# Patient Record
Sex: MALE | Race: WHITE | NOT HISPANIC OR LATINO | URBAN - METROPOLITAN AREA
[De-identification: names, ages, dates, MRNs, and addresses within clinical notes are randomized per-mention and may not be internally consistent; named-entity substitution may affect disease eponyms.]

---

## 2024-09-11 ENCOUNTER — APPOINTMENT (RX ONLY)
Dept: URBAN - METROPOLITAN AREA CLINIC 61 | Facility: CLINIC | Age: 39
Setting detail: DERMATOLOGY
End: 2024-09-11

## 2024-09-11 DIAGNOSIS — L70.0 ACNE VULGARIS: ICD-10-CM

## 2024-09-11 DIAGNOSIS — L20.89 OTHER ATOPIC DERMATITIS: ICD-10-CM

## 2024-09-11 PROCEDURE — ? PATIENT SPECIFIC COUNSELING

## 2024-09-11 PROCEDURE — 99213 OFFICE O/P EST LOW 20 MIN: CPT

## 2024-09-11 PROCEDURE — ? PRESCRIPTION

## 2024-09-11 RX ORDER — CLOBETASOL PROPIONATE 0.05 G/100ML
SHAMPOO TOPICAL
Qty: 118 | Refills: 2 | Status: ERX | COMMUNITY
Start: 2024-09-11

## 2024-09-11 RX ADMIN — CLOBETASOL PROPIONATE: 0.05 SHAMPOO TOPICAL at 00:00

## 2024-09-11 ASSESSMENT — SEVERITY ASSESSMENT OVERALL AMONG ALL PATIENTS
IN YOUR EXPERIENCE, AMONG ALL PATIENTS YOU HAVE SEEN WITH THIS CONDITION, HOW SEVERE IS THIS PATIENT'S CONDITION?: NORMAL

## 2024-09-11 NOTE — PROCEDURE: PATIENT SPECIFIC COUNSELING
Patient was treated with a course of isotretinoin with complete clearing of his acne on the body.  On exam today he did not have any new lesions on his abdomen.  He is reporting that his scalp is flaring again, but on exam he had no acneiform lesions on the scalp, more eczematous.
Detail Level: Zone
Patient has ill-defined erythematous plaques with slight scale on the scalp consistent with an eczematous process.  He reports that he scalp is very itchy and tender at times.  He has previously tried topical steroid solutions without much improvement.  He has also been treated with medicated shampoo without improvement.  He underwent a course of isotretinoin which led to some improvement on his scalp, but since he has been off for the last couple months he has had recurrence of the symptoms.  This looks more eczematous in nature, we will try Clobex shampoo to see if that leads to any relief.  When he he returns to clinic in 2 months time if he is still having some flaring then may consider a KOH.  This does not look typical for tinea but would rule that out if it continues to flare.  Could also consider a punch biopsy if he is continue to have symptoms.

## 2024-10-07 ENCOUNTER — RX ONLY (OUTPATIENT)
Age: 39
Setting detail: RX ONLY
End: 2024-10-07

## 2024-10-07 RX ORDER — BETAMETHASONE DIPROPIONATE 0.5 MG/ML
LOTION TOPICAL
Qty: 60 | Refills: 0 | Status: ERX | COMMUNITY
Start: 2024-10-07

## 2025-04-09 ENCOUNTER — APPOINTMENT (OUTPATIENT)
Dept: URBAN - METROPOLITAN AREA CLINIC 61 | Facility: CLINIC | Age: 40
Setting detail: DERMATOLOGY
End: 2025-04-09

## 2025-04-09 DIAGNOSIS — L72.8 OTHER FOLLICULAR CYSTS OF THE SKIN AND SUBCUTANEOUS TISSUE: ICD-10-CM

## 2025-04-09 DIAGNOSIS — D22 MELANOCYTIC NEVI: ICD-10-CM

## 2025-04-09 DIAGNOSIS — L29.89 OTHER PRURITUS: ICD-10-CM

## 2025-04-09 PROBLEM — D22.5 MELANOCYTIC NEVI OF TRUNK: Status: ACTIVE | Noted: 2025-04-09

## 2025-04-09 PROCEDURE — ? COUNSELING

## 2025-04-09 PROCEDURE — ? PATIENT SPECIFIC COUNSELING

## 2025-04-09 PROCEDURE — 99213 OFFICE O/P EST LOW 20 MIN: CPT

## 2025-04-09 PROCEDURE — ? DEFER

## 2025-04-09 ASSESSMENT — LOCATION DETAILED DESCRIPTION DERM
LOCATION DETAILED: LEFT POSTERIOR SHOULDER
LOCATION DETAILED: LEFT INFERIOR UPPER BACK
LOCATION DETAILED: RIGHT INFERIOR MEDIAL MIDBACK

## 2025-04-09 ASSESSMENT — LOCATION SIMPLE DESCRIPTION DERM
LOCATION SIMPLE: LEFT UPPER BACK
LOCATION SIMPLE: RIGHT LOWER BACK
LOCATION SIMPLE: LEFT SHOULDER

## 2025-04-09 ASSESSMENT — LOCATION ZONE DERM
LOCATION ZONE: ARM
LOCATION ZONE: TRUNK

## 2025-04-09 NOTE — PROCEDURE: PATIENT SPECIFIC COUNSELING
Patient presents today with history of scalp itching that is very bothersome for him. He states he sometimes has flares of \"blister-like\" lesions on his scalp, but there are not any today. He has a few areas of excoriation on his scalp with hemorrhagic crusting. Patient has previously been treated for seborrheic dermatitis and eczema on the scalp with multiple different medicated shampoos and topical steroids without much relief. Patient also has a history of body and scalp acne, which was treated with isotretinoin. The acne on his body completely resolve, but the treatment did not clear his scalp itching. \\n\\nWe discussed that if he gets another flare of pustule-like bumps, he should contact clinic and discuss with an MA to get an urgent appointment when it is flared. As he has not responded to treatment, the next step would be a biopsy, may consider a DIF as well. \\n\\Cony he continues to have scalp itching even when he does not have any active skin condition on his scalp, we discussed that sometimes itching can be neurogenic in nature. He is going to see a Neurologist next month to discuss the possibility of neurogenic pruritus. We discussed some possible medications that can treat neurogenic pruritus such as gabapentin and amitriptyline, but patient reports that he has previously been on gabapentin for a different issue and he thought amitriptyline \"gave me a seizure\". Due to his complicated neurologic history, I would like any of these potential interventions managed by Neurology.\\n\\nIf he gets more inflammatory lesions, I will get a biopsy and manage any skin disease that may be impacting his scalp symptoms.
Detail Level: Zone

## 2025-04-09 NOTE — PROCEDURE: COUNSELING
Detail Level: Detailed
Patient Specific Counseling (Will Not Stick From Patient To Patient): Patient reports the cyst on his left posterior shoulder gets tender and he would like it removed. He will be scheduled for an excision.
Detail Level: Simple

## 2025-05-27 ENCOUNTER — APPOINTMENT (OUTPATIENT)
Dept: URBAN - METROPOLITAN AREA CLINIC 59 | Facility: CLINIC | Age: 40
Setting detail: DERMATOLOGY
End: 2025-05-27

## 2025-05-27 DIAGNOSIS — L72.8 OTHER FOLLICULAR CYSTS OF THE SKIN AND SUBCUTANEOUS TISSUE: ICD-10-CM

## 2025-05-27 PROCEDURE — 11402 EXC TR-EXT B9+MARG 1.1-2 CM: CPT

## 2025-05-27 PROCEDURE — 12032 INTMD RPR S/A/T/EXT 2.6-7.5: CPT

## 2025-05-27 PROCEDURE — ? EXCISION

## 2025-05-27 ASSESSMENT — LOCATION DETAILED DESCRIPTION DERM: LOCATION DETAILED: LEFT POSTERIOR SHOULDER

## 2025-05-27 ASSESSMENT — LOCATION ZONE DERM: LOCATION ZONE: ARM

## 2025-05-27 ASSESSMENT — LOCATION SIMPLE DESCRIPTION DERM: LOCATION SIMPLE: LEFT SHOULDER

## 2025-05-27 NOTE — PROCEDURE: EXCISION

## 2025-06-04 ENCOUNTER — APPOINTMENT (OUTPATIENT)
Dept: URBAN - METROPOLITAN AREA CLINIC 59 | Facility: CLINIC | Age: 40
Setting detail: DERMATOLOGY
End: 2025-06-04

## 2025-06-04 DIAGNOSIS — Z48.817 ENCOUNTER FOR SURGICAL AFTERCARE FOLLOWING SURGERY ON THE SKIN AND SUBCUTANEOUS TISSUE: ICD-10-CM

## 2025-06-04 PROCEDURE — ? ORDER TESTS

## 2025-06-04 PROCEDURE — ? PATIENT SPECIFIC COUNSELING

## 2025-06-04 PROCEDURE — ? PRESCRIPTION

## 2025-06-04 RX ORDER — CEPHALEXIN 500 MG/1
CAPSULE ORAL
Qty: 30 | Refills: 0 | Status: ERX | COMMUNITY
Start: 2025-06-04

## 2025-06-04 RX ADMIN — CEPHALEXIN: 500 CAPSULE ORAL at 00:00

## 2025-06-04 NOTE — PROCEDURE: PATIENT SPECIFIC COUNSELING
Patient had some discharge, redness, and pain at the surgical site on the left upper back.  It appears to be infected and patient will be started on Keflex 3 times daily for 7 to 10 days.  A wound culture was taken today.  Sutures were removed today.
Detail Level: Zone

## 2025-06-04 NOTE — PROCEDURE: ORDER TESTS
Bill For Surgical Tray: no
Billing Type: Third-Party Bill
Performing Laboratory: 0
Expected Date Of Service: 06/04/2025

## 2025-06-17 ENCOUNTER — RX ONLY (RX ONLY)
Age: 40
End: 2025-06-17

## 2025-06-17 RX ORDER — CEPHALEXIN 500 MG/1
CAPSULE ORAL
Qty: 30 | Refills: 0 | Status: ERX

## 2025-07-22 ENCOUNTER — APPOINTMENT (OUTPATIENT)
Dept: URBAN - METROPOLITAN AREA CLINIC 59 | Facility: CLINIC | Age: 40
Setting detail: DERMATOLOGY
End: 2025-07-22

## 2025-07-22 DIAGNOSIS — Z48.02 ENCOUNTER FOR REMOVAL OF SUTURES: ICD-10-CM

## 2025-07-22 DIAGNOSIS — R21 RASH AND OTHER NONSPECIFIC SKIN ERUPTION: ICD-10-CM

## 2025-07-22 DIAGNOSIS — L29.89 OTHER PRURITUS: ICD-10-CM

## 2025-07-22 PROCEDURE — ? PATIENT SPECIFIC COUNSELING

## 2025-07-22 PROCEDURE — ? ORDER TESTS

## 2025-07-22 PROCEDURE — ? PRESCRIPTION

## 2025-07-22 PROCEDURE — ? COUNSELING

## 2025-07-22 RX ORDER — CLOBETASOL PROPIONATE 0.05 G/100ML
SHAMPOO TOPICAL
Qty: 118 | Refills: 0 | Status: ERX

## 2025-07-22 ASSESSMENT — LOCATION SIMPLE DESCRIPTION DERM: LOCATION SIMPLE: LEFT UPPER BACK

## 2025-07-22 ASSESSMENT — LOCATION DETAILED DESCRIPTION DERM: LOCATION DETAILED: LEFT SUPERIOR LATERAL UPPER BACK

## 2025-07-22 ASSESSMENT — LOCATION ZONE DERM: LOCATION ZONE: TRUNK

## 2025-07-22 NOTE — PROCEDURE: PATIENT SPECIFIC COUNSELING
Doing a bacterial, HSV, and VZV culture on the right nares as patient reports recurrent erosions and scaling at this site as well. He states it feels like what is in the nose is similar to what is on his scalp. He will treat site with Vaseline until we get results from the culture.
Detail Level: Zone

## 2025-07-22 NOTE — PROCEDURE: COUNSELING
Patient Specific Counseling (Will Not Stick From Patient To Patient): Patient had a cyst excision at this site, which developed an MSSA infection that was treated with Keflex. No sign of residual infection today. Instead, he has a spitting suture at the site, which I removed.
Detail Level: Detailed
Patient Specific Counseling (Will Not Stick From Patient To Patient): Patient reports his scalp is very itchy. On exam, there are several erosions, which could be consistent with excoriations at the site, but want to rule out an infectious etiology. We are going to take cultures on the scalp for bacterial, HSV, and VZV to ensure we are not missing an infection that is leading to his symptoms. He would like to try Clobex shampoo again to help relieve the itching while we await the biopsy results.

## 2025-07-22 NOTE — PROCEDURE: ORDER TESTS
Billing Type: Third-Party Bill
Bill For Surgical Tray: no
Performing Laboratory: 0
Expected Date Of Service: 07/22/2025

## 2025-08-01 ENCOUNTER — RX ONLY (RX ONLY)
Age: 40
End: 2025-08-01

## 2025-08-01 RX ORDER — MUPIROCIN 20 MG/G
OINTMENT TOPICAL
Qty: 22 | Refills: 1 | Status: ERX | COMMUNITY
Start: 2025-07-31

## 2025-08-01 RX ORDER — DOXYCYCLINE HYCLATE 100 MG/1
CAPSULE, GELATIN COATED ORAL
Qty: 20 | Refills: 0 | Status: ERX | COMMUNITY
Start: 2025-07-31

## 2025-08-01 RX ORDER — CHLORHEXIDINE GLUCONATE 40 MG/ML
SOLUTION TOPICAL
Qty: 473 | Refills: 1 | Status: ERX | COMMUNITY
Start: 2025-07-31

## 2025-08-01 RX ORDER — RIFAMPIN 300 MG/1
CAPSULE ORAL
Qty: 20 | Refills: 0 | Status: ERX | COMMUNITY
Start: 2025-07-31

## 2025-08-19 ENCOUNTER — RX ONLY (RX ONLY)
Age: 40
End: 2025-08-19

## 2025-08-19 RX ORDER — DOXYCYCLINE HYCLATE 100 MG/1
CAPSULE, GELATIN COATED ORAL
Qty: 60 | Refills: 0 | Status: ERX

## 2025-08-19 RX ORDER — RIFAMPIN 300 MG/1
CAPSULE ORAL
Qty: 28 | Refills: 0 | Status: ERX